# Patient Record
Sex: MALE | Race: WHITE
[De-identification: names, ages, dates, MRNs, and addresses within clinical notes are randomized per-mention and may not be internally consistent; named-entity substitution may affect disease eponyms.]

---

## 2018-09-29 ENCOUNTER — HOSPITAL ENCOUNTER (EMERGENCY)
Dept: HOSPITAL 75 - ER | Age: 38
LOS: 1 days | Discharge: HOME | End: 2018-09-30
Payer: COMMERCIAL

## 2018-09-29 VITALS — WEIGHT: 198 LBS | HEIGHT: 68 IN | BODY MASS INDEX: 30.01 KG/M2
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
## 2018-09-29 DIAGNOSIS — F17.210: ICD-10-CM
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
## 2018-09-29 DIAGNOSIS — S90.32XA: Primary | ICD-10-CM

## 2018-09-29 PROCEDURE — 73630 X-RAY EXAM OF FOOT: CPT

## 2018-09-29 PROCEDURE — 73610 X-RAY EXAM OF ANKLE: CPT

## 2018-09-29 NOTE — XMS REPORT
Continuity of Care Document

 Created on: 2013



IGNACIO ANDERSON

External Reference #: [id number]

: 1980

Sex: Male



Demographics







 Address  [address] [place]  [address]

 

 Home Phone  [phone]

 

 Preferred Language  Unknown

 

 Marital Status  Unknown

 

 Sikhism Affiliation  Unknown

 

 Race  Unknown

 

 Ethnic Group  Unknown





Author







 Author  [facility]

 

 Organization  [facility]

 

 Address  Unknown

 

 Phone  Unavailable







Support







 Name  Relationship  Address  Phone

 

 JUAN ANDERSON  Next Of Kin  [address] [place]  [address] [phone]







Care Team Providers







 Care Team Member Name  Role  Phone

 

 [facility]    [phone]



                                            



Insurance Providers

                      





 Payer Name                    Policy Number                    Subscriber Name
                    Relationship                

 

 Self Pay                                         Ignacio Anderson             
       01 Self / Same As Patient                



                                                                    



Advance Directives

                      





 Directive                    Response                    Recorded Date        
        

 

 Advance Directives                    N                    13 11:11pm   
             



                                                                               
         



Problems

          No Known Problems or Medical conditions.                             
                                       



Social History

                      





 History                    Response                    Recorded Date/Time     
           

 

 Alcohol Use                    Denies Use                    13 11:11pm 
               

 

 Recreational Drug Use                    N                    13 11:11pm
                



                                                                               
         



Allergies, Adverse Reactions, Alerts

                      





 Allergen                    Type                    Severity                   
 Reaction                    Last Updated                

 

 NKANo Known Allergies                    Allergy                    Unknown   
                                      06                



                                                                               
                   



Medications

                      





 Medication                    Dose                    Units                    
Route                    Sig                    Qty                    Days    
            

 

 Naproxen (Naprosyn)                    1                    Each              
      PO                    BID PRN                    20                      
               

 

 Hydrocodone Bit/Acetaminophen (Hydrocodon-Acetaminophen 5-325)                
    1 - 2                    Each                    PO                    Q6H 
PRN                    20                                     



                                                                               
         



Pregnancy

                      





 Response                    Recorded Date/Time                

 

 Status not known                    Unknown                



                                                                              



Results

          No Known Relevant Diagnostic Tests, Laboratory Data and/or Discharge 
Summary.                                                                    



Procedures

                      





 Procedure                    Code                    Date                

 

 COLONOSCOPY                    45.23                    06              
  

 

 ESOPHAGOGASTRODUODENOSCOPY [EGD] W/CLOSED BIOPSY                    45.16     
               06                

 

 UPPER GI ENDOSCOPY BIOPSY                    22582                    06
                



                                                                               
                   



Encounters

                      





 Encounter                    Location                    Date/Time            
    

 

 Departed Emergency Room                    [facility]                     11:01pm                

 

 Discharged Inpatient                    [facility]                     12:
00am
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
## 2018-09-29 NOTE — XMS REPORT
[facility]

 Created on: 2017



Smiley Ignacio

External Reference #: [id number]

: 1980

Sex: Male



Demographics







 Address  [address] [place]  [address]

 

 Preferred Language  Unknown

 

 Marital Status  Unknown

 

 Gnosticism Affiliation  Unknown

 

 Race  Unknown

 

 Ethnic Group  Unknown





Author







 Author  ELKIN CABRERA

 

 [facility]

 

 Address  Unknown

 

 Phone  [phone]







Care Team Providers







 Care Team Member Name  Role  Phone

 

 DEBORAH  ELKIN  Unavailable  [phone]







PROBLEMS







 Type  Condition  ICD9-CM Code  XNK19-VQ Code  Onset Dates  Condition Status  
SNOMED Code

 

 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







ALLERGIES

No Known Allergies



SOCIAL HISTORY

Never Assessed



PLAN OF CARE







 Activity  Details









  









 Follow Up  1 Week Reason:#19-te







VITAL SIGNS







 Height  68 in  2017

 

 Blood pressure systolic  152 mmHg  2017

 

 Blood pressure diastolic  105 mmHg  2017







MEDICATIONS







 Medication  Instructions  Dosage  Frequency  Start Date  End Date  Duration  
Status

 

 Amoxicillin 500 MG  Orally three times a day  1 tablet  8h    7 days  Active







RESULTS

No Results



PROCEDURES







 Procedure  Date Ordered  Result  Body Site

 

 LTD ORAL EVALUATION - PROBLEM FOCUS  2017      

 

 INTRAORL-PERIAPICAL 1 FILM 85055  2017      







IMMUNIZATIONS

No Known Immunizations



MEDICAL (GENERAL) HISTORY







 Type  Description  Date

 

 Medical History  bronchitis   

 

 Medical History  Seizures   

 

 Medical History  Ulcers   

 

 Medical History  Blood transfusion   

 

 Medical History  thyroid problems

## 2018-09-29 NOTE — XMS REPORT
Continuity of Care Document

 Created on: 10/06/2013



IGNACIO ANDERSON

External Reference #: [id number]

: 1980

Sex: Male



Demographics







 Address  [address] [place]  [address]

 

 Home Phone  [phone]

 

 Preferred Language  Unknown

 

 Marital Status  Unknown

 

 Jain Affiliation  Unknown

 

 Race  Unknown

 

 Ethnic Group  Unknown





Author







 Author  [facility]

 

 Organization  [facility]

 

 Address  Unknown

 

 Phone  Unavailable







Support







 Name  Relationship  Address  Phone

 

 JUAN ANDERSON  Next Of Kin  [address] [place]  [address] [phone]







Care Team Providers







 Care Team Member Name  Role  Phone

 

 [facility]    [phone]



                                            



Insurance Providers

                      





 Payer Name                    Policy Number                    Subscriber Name
                    Relationship                

 

 Self Pay                                         Ignacio Anderson             
       01 Self / Same As Patient                



                                                                    



Advance Directives

                      





 Directive                    Response                    Recorded Date        
        

 

 Advance Directives                    N                    10/06/13 9:49pm    
            

 

 Organ Donor                    Y                    10/06/13 9:49pm           
     



                                                                               
         



Problems

          No Known Problems or Medical conditions.                             
                                       



Social History

                      





 History                    Response                    Recorded Date/Time     
           

 

 Alcohol Use                    Denies Use                    10/06/13 9:49pm  
              

 

 Recreational Drug Use                    N                    10/06/13 9:49pm 
               



                                                                               
         



Allergies, Adverse Reactions, Alerts

                      





 Allergen                    Type                    Severity                   
 Reaction                    Last Updated                

 

 NKANo Known Allergies                    Allergy                    Unknown   
                                      06                



                                                                               
                   



Medications

                      





 Medication                    Dose                    Units                    
Route                    Sig                    Qty                    Days    
            

 

 Prednisone                    40                    Mg                    PO  
                  BID                                         4                



                                                                              



Pregnancy

                      





 Response                    Recorded Date/Time                

 

 Status not known                    Unknown                



                                                                              



Results

          No Known Relevant Diagnostic Tests, Laboratory Data and/or Discharge 
Summary.                                                                    



Procedures

                      





 Procedure                    Code                    Date                

 

 COLONOSCOPY                    45.23                    06              
  

 

 ESOPHAGOGASTRODUODENOSCOPY [EGD] W/CLOSED BIOPSY                    45.16     
               06                

 

 UPPER GI ENDOSCOPY BIOPSY                    15275                    06
                



                                                                               
                   



Encounters

                      





 Encounter                    Location                    Date/Time            
    

 

 Departed Emergency Room                    [facility]                    10/
06/13 9:42pm                

 

 Discharged Inpatient                    [facility]                     12:
00am

## 2018-09-30 VITALS — SYSTOLIC BLOOD PRESSURE: 140 MMHG | DIASTOLIC BLOOD PRESSURE: 90 MMHG

## 2018-09-30 NOTE — DIAGNOSTIC IMAGING REPORT
Indication: Pain.



Three views were obtained.



Findings: The alignment is normal. There is fracture or

dislocation. Soft tissues are unremarkable. 



Impression: No acute fracture or dislocation.



Dictated by: 



  Dictated on workstation # [id number]

## 2018-09-30 NOTE — DIAGNOSTIC IMAGING REPORT
INDICATION: Pain.



3 views of left ankle were obtained.



FINDINGS: The alignment is normal. The plafonds and talar dome

are intact. Ankle mortise is symmetric. There is no fracture or

dislocation. Soft tissues are unremarkable.



IMPRESSION: No focal abnormality in the left ankle.



Dictated by: 



  Dictated on workstation # [id number]

## 2019-12-09 ENCOUNTER — HOSPITAL ENCOUNTER (EMERGENCY)
Dept: HOSPITAL 75 - ER | Age: 39
LOS: 1 days | Discharge: HOME | End: 2019-12-10
Payer: SELF-PAY

## 2019-12-09 VITALS — WEIGHT: 205.03 LBS | HEIGHT: 67.8 IN | BODY MASS INDEX: 31.44 KG/M2

## 2019-12-09 DIAGNOSIS — Z87.891: ICD-10-CM

## 2019-12-09 DIAGNOSIS — K02.9: Primary | ICD-10-CM

## 2019-12-09 PROCEDURE — 99283 EMERGENCY DEPT VISIT LOW MDM: CPT

## 2019-12-10 VITALS — SYSTOLIC BLOOD PRESSURE: 183 MMHG | DIASTOLIC BLOOD PRESSURE: 96 MMHG

## 2019-12-10 NOTE — ED EENT
History of Present Illness


General


Chief Complaint:  Dental Problems/Pain


Stated Complaint:  POSS RT SIDE DENTAL PAIN


Nursing Triage Note:  


PT AMBULATE TO [facility] WITH C/O RIGHT SIDED DENTAL PAIN X1 WEEK. PT REPORTS THAT 


IT IS "THROWING MY EQUILIBRIUM OFF".


Source:  patient


Exam Limitations:  no limitations





History of Present Illness


Date Seen by Provider:  Dec 10, 2019


Time Seen by Provider:  00:54


Initial Comments


This 39-year-old man presents to the emergency room with complaints of pain in 

the right maxilla shooting down toward his neck and toward the right ear he 

believes this is coming from his teeth.  He denies any fever.  He has not taken 

any medications for this and has not seen the dentist.  Pain is been present for

a week and is escalating.





Allergies and Home Medications


Allergies


Coded Allergies:  


     REBAANo Known Allergies (Verified  Allergy, Unknown, 12/11/06)





Home Medications


Amoxicillin 500 Mg Capsule, 1,000 MG PO BID


   Prescribed by: MARTÍN MAURICIO on 12/10/19 0105


Naproxen 500 Mg Tablet, 500 MG PO BID


   Prescribed by: LEANDRO ZUNIGA on 9/30/18 0201





Patient Home Medication List


Home Medication List Reviewed:  Yes





Review of Systems


Review of Systems


Constitutional:  no symptoms reported


Eyes:  No Symptoms Reported


Ears:  No Symptoms Reported


Nose:  no symptoms reported


Mouth:  see HPI


Throat:  no symptoms reported


Respiratory:  no symptoms reported


Cardiovascular:  no symptoms reported


Gastrointestinal:  no symptoms reported


Musculoskeletal:  no symptoms reported


Skin:  no symptoms reported


Neurological:  No Symptoms Reported





Past Medical-Social-Family Hx


Past Med/Social Hx:  Reviewed Nursing Past Med/Soc Hx


Patient Social History


Alcohol Use:  Denies Use


Recreational Drug Use:  No


Smoking Status:  Former Smoker


Type Used:  Cigarettes


Former Smoker, Quit:  Nov 26, 2019


2nd Hand Smoke Exposure:  No


Recent Foreign Travel:  No


Contact w/Someone Who Travel:  No


Recent Infectious Disease Expo:  No


Recent Hopitalizations:  No


Physical Abuse:  No


Sexual Abuse:  No


Mistreated:  No


Fear:  No





Immunizations Up To Date


Tetanus Booster (TDap):  Less than 5yrs





Seasonal Allergies


Seasonal Allergies:  No





Past Medical History


Surgeries:  Yes


Abdominal (EGD and colonoscopy)


Respiratory:  No


Cardiac:  No


Neurological:  No


Genitourinary:  No


Gastrointestinal:  Yes


Ulcer


Musculoskeletal:  Yes


Arthritis, Chronic Back Pain


Endocrine:  No


HEENT:  No


Cancer:  No


Psychosocial:  No


Integumentary:  No


Blood Disorders:  No


Adverse Reaction/Blood Tranf:  No





Family Medical History


No Pertinent Family Hx





Physical Exam


Vital Signs





Vital Signs - First Documented








 12/9/19 12/10/19





 23:16 01:20


 


Temp 36.9 


 


Pulse 80 


 


Resp 18 


 


B/P (MAP) 183/96 (125) 


 


Pulse Ox  100


 


O2 Delivery Room Air 








Height, Weight, BMI


Height: 5'8"


Weight: 198lbs. oz. 89.354223ew; 31.00 BMI


Method:Stated


General Appearance:  WD/WN, mild distress


Eyes:  bilateral eye normal inspection, bilateral eye PERRL, bilateral eye EOMI


Ears:  bilateral ear auricle normal, bilateral ear canal normal, bilateral ear 

TM normal


Nose:  normal inspection


Mouth/Throat:  pharynx normal, other (numerous severely decayed teeth.  No overt

inflammatory process or abscess.)


Neck:  normal inspection


Cardiovascular:  regular rate, rhythm, no edema, no murmur


Respiratory:  lungs clear, normal breath sounds, no respiratory distress


Neurologic/Psychiatric:  CNs II-XII nml as tested, no motor/sensory deficits, 

alert, normal mood/affect, oriented x 3


Skin:  normal color, warm/dry





Procedures/Interventions





   Suture Size:  5-0





Progress/Results/Core Measures


Results/Orders


My Orders





Orders - MARTÍN LOYD MD


Amoxicillin Capsule (Polymox Capsule) (12/10/19 01:15)





Vital Signs/I&O











Blood Pressure Mean:                    125


POS








Progress


Progress Note :  


Progress Note


Patient was started on amoxicillin in the emergency room.  Since he has not yet 

tried any medications, he was advised to start with over-the-counter ibuprofen 

and Tylenol.  See discharge instructions.





Departure


Impression





   Primary Impression:  


   Dental decay


   Additional Impression:  


   Pain, dental


Disposition:  01 HOME, SELF-CARE


Condition:  Stable





Departure-Patient Inst.


Decision time for Depature:  01:03


Referrals:  


[facility] (PCP/Family)


Primary Care Physician


Patient Instructions:  Dental Pain, Tooth Decay, Adult (DC)





Add. Discharge Instructions:  


Complete your antibiotics as prescribed and see a dentist as soon as possible.


You may take ibuprofen up to 600 mg every 6 hours as needed and/or Tylenol 

(acetaminophen) up to 1000 mg every 6 hours as needed.


Brush your teeth gently with a soft bristle toothbrush at least twice daily.  If

tolerated, also rinse with antiseptic mouthwash.


Return to care if you have worsening symptoms or develop new symptoms such as 

fever.











All discharge instructions reviewed with patient and/or family. Voiced 

understanding.


Scripts


Amoxicillin (Amoxicillin) 500 Mg Capsule


1000 MG PO BID, #40 CAP 0 Refills


   Prov: MARTÍN LOYD MD         12/10/19











MARTÍN LOYD MD        Dec 10, 2019 01:08


POS

## 2019-12-22 ENCOUNTER — HOSPITAL ENCOUNTER (EMERGENCY)
Dept: HOSPITAL 75 - ER | Age: 39
Discharge: HOME | End: 2019-12-22
Payer: SELF-PAY

## 2019-12-22 VITALS — DIASTOLIC BLOOD PRESSURE: 90 MMHG | SYSTOLIC BLOOD PRESSURE: 142 MMHG

## 2019-12-22 VITALS — HEIGHT: 67.99 IN | BODY MASS INDEX: 30.31 KG/M2 | WEIGHT: 199.96 LBS
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
## 2019-12-22 DIAGNOSIS — Z87.891: ICD-10-CM

## 2019-12-22 DIAGNOSIS — S60.222A: Primary | ICD-10-CM

## 2019-12-22 PROCEDURE — 73130 X-RAY EXAM OF HAND: CPT

## 2019-12-22 NOTE — DIAGNOSTIC IMAGING REPORT
Left hand at 949 hours.



INDICATION: Injury, hand pain.



3 views were obtained.



FINDINGS: There is no fracture, dislocation or acute bony

abnormality evident. The soft tissues are unremarkable. There is

no sign of a radiopaque foreign body.



IMPRESSION: There is no evidence for an acute bony abnormality or

for a radiopaque foreign body.



Dictated by: 



  Dictated on workstation # [id number]

## 2019-12-22 NOTE — ED UPPER EXTREMITY
General


Chief Complaint:  Upper Extremity


Stated Complaint:  L HAND INJ/SWELLING


Source:  patient


Exam Limitations:  no limitations





History of Present Illness


Date Seen by Provider:  Dec 22, 2019


Time Seen by Provider:  09:26


Initial Comments


Patient is a pleasant left-handed gentleman who presents to ER by private 

conveyance because of swelling and pain and limited mobility in his left hand. 

Approximate 1700 yesterday about 16 hours ago he was working on a vehicle in his

left hand slipped off a tool hitting the dorsum of his hand against the fender 

well. He denies any numbness tingling weakness or previous fracture/surgery. No 

diabetes or blood thinners. No pain meds or ice.





Allergies and Home Medications


Allergies


Coded Allergies:  


     REBAANo Known Allergies (Verified  Allergy, Unknown, 06)





Home Medications


Amoxicillin 500 Mg Capsule, 1,000 MG PO BID


   Prescribed by: MARTÍN MAURICIO on 12/10/19 0105


Naproxen 500 Mg Tablet, 500 MG PO BID


   Prescribed by: LEANDRO ZUNIGA on 18 0201





Patient Home Medication List


Home Medication List Reviewed:  Yes





Review of Systems


Constitutional:  No chills, No diaphoresis


EENTM:  No ear discharge, No ear pain, No blurred vision


Respiratory:  No cough, No short of breath


Cardiovascular:  No chest pain, No edema


Gastrointestinal:  No abdominal pain, No nausea, No vomiting


Genitourinary:  No discharge, No dysuria


Musculoskeletal:  see HPI


Skin:  see HPI


Psychiatric/Neurological:  Denies Anxiety, Denies Depressed





Past Medical-Social-Family Hx


Patient Social History


Alcohol Use:  Denies Use


Recreational Drug Use:  No


Smoking Status:  Former Smoker


Type Used:  Cigarettes


Former Smoker, Quit:  2019


2nd Hand Smoke Exposure:  No


Recent Foreign Travel:  No


Contact w/Someone Who Travel:  No


Recent Hopitalizations:  No





Immunizations Up To Date


Tetanus Booster (TDap):  Less than 5yrs





Seasonal Allergies


Seasonal Allergies:  No





Past Medical History


Surgeries:  Yes


Abdominal


Respiratory:  No


Cardiac:  No


Neurological:  No


Genitourinary:  No


Gastrointestinal:  Yes


Ulcer


Musculoskeletal:  Yes


Arthritis, Chronic Back Pain


Endocrine:  No


HEENT:  No


Cancer:  No


Psychosocial:  No


Integumentary:  No


Blood Disorders:  No


Adverse Reaction/Blood Tranf:  No





Family Medical History


No Pertinent Family Hx





Physical Exam


Vital Signs





Vital Signs - First Documented








 19





 09:35


 


Temp 36.5


 


Pulse 68


 


B/P (MAP) 147/104 (118)


 


O2 Delivery Room Air





Capillary Refill :


Height, Weight, BMI


Height: 5'8"


Weight: 198lbs. oz. 89.236060lp; 31.00 BMI


Method:Stated


General Appearance:  WD/WN, no apparent distress


Cardiovascular:  normal peripheral pulses, regular rate, rhythm


Respiratory:  no respiratory distress, no accessory muscle use


Wrist:  Yes normal inspection, Yes non-tender, Yes no evidence of injury, Yes 

normal ROM


Hand:  Left, bone tenderness (third metacarpal and third digit proximal 

phalanx), limited ROM (lacks about 15% of the flexion), soft tissue tenderness, 

swelling (moderate dorsum of the hand over the third metacarpal)


Neurologic/Tendon:  normal sensation, normal motor functions, normal tendon 

functions


Neurologic/Psychiatric:  alert, normal mood/affect





Procedures/Interventions





   Suture Size:  5-0





Progress/Results/Core Measures


Results/Orders


My Orders





Orders - SANDI PADRON


Hand, Left, 3 Views (19 09:36)





Vital Signs/I&O











 19





 09:35


 


Temp 36.5


 


Pulse 68


 


B/P (MAP) 147/104 (118)


 


O2 Delivery Room Air











Progress


Progress Note :  


   Time:  09:40


Progress Note


He has declined anything for pain this time. Ice pack and compression. X-ray of 

the left hand 3 view.





Diagnostic Imaging





   Diagonstic Imaging:  Xray


   Plain Films/CT/US/NM/MRI:  hand (Left)


Comments


NAME:   JIM ANDERSON


MED REC#:   [id number]


ACCOUNT#:   [id number]


PT STATUS:   REG ER


:   1980


PHYSICIAN:   SANDI PADRON MD


ADMIT DATE:   19/ER


                                   ***Draft***


Date of Exam:19





HAND, LEFT, 3 VIEWS





Left hand at 949 hours.





INDICATION: Injury, hand pain.





3 views were obtained.





FINDINGS: There is no fracture, dislocation or acute bony


abnormality evident. The soft tissues are unremarkable. There is


no sign of a radiopaque foreign body.





IMPRESSION: There is no evidence for an acute bony abnormality or


for a radiopaque foreign body.





  Dictated on workstation # [id number]





Dict:   [id number]


Trans:   19 [address] [place] [address] [id number]





Interpreted by:     MARGO BRANDON MD


Electronically signed by:


   Reviewed:  Reviewed by Me





Departure


Impression





   Primary Impression:  


   Contusion of hand


   Qualified Codes:  S60.222A - Contusion of left hand, initial encounter


Disposition:  01 HOME, SELF-CARE


Condition:  Stable





Departure-Patient Inst.


Decision time for Depature:  10:05


Referrals:  


[facility] (PCP/Family)


Primary Care Physician


Patient Instructions:  Contusion (DC)





Add. Discharge Instructions:  


Contusion to your hands just a large bruise and soft-tissue injury which should 

resolve on its own over the next 1-2 weeks. 


An ice pack for the first 2-3 days applied every 4 hours for 20 minutes can be 

helpful.


Wrap your hand with an Ace bandage for compression to help keep the swelling and

pain down.


Tylenol and ibuprofen as necessary for pain.


If you're still having significant pain or difficulty moving your hand in 7-10 

days then you need to follow-up with the primary care doctor for reevaluation 

and for possible missed fractures. 


All discharge instructions reviewed with patient and/or family. Voiced 

understanding.











SANDI PADRON                 Dec 22, 2019 09:40

## 2021-03-09 ENCOUNTER — HOSPITAL ENCOUNTER (EMERGENCY)
Dept: HOSPITAL 75 - ER | Age: 41
Discharge: HOME | End: 2021-03-09
Payer: COMMERCIAL

## 2021-03-09 VITALS — BODY MASS INDEX: 32.96 KG/M2 | WEIGHT: 214.95 LBS | HEIGHT: 67.72 IN

## 2021-03-09 VITALS — DIASTOLIC BLOOD PRESSURE: 65 MMHG | SYSTOLIC BLOOD PRESSURE: 115 MMHG

## 2021-03-09 DIAGNOSIS — S30.811A: ICD-10-CM

## 2021-03-09 DIAGNOSIS — S80.212A: ICD-10-CM

## 2021-03-09 DIAGNOSIS — F17.210: ICD-10-CM
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
## 2021-03-09 DIAGNOSIS — S20.212A: Primary | ICD-10-CM

## 2021-03-09 DIAGNOSIS — S00.81XA: ICD-10-CM

## 2021-03-09 DIAGNOSIS — R40.2250: ICD-10-CM

## 2021-03-09 DIAGNOSIS — R40.2140: ICD-10-CM

## 2021-03-09 DIAGNOSIS — R40.2360: ICD-10-CM

## 2021-03-09 DIAGNOSIS — Z23: ICD-10-CM

## 2021-03-09 LAB
ALBUMIN SERPL-MCNC: 4.6 GM/DL (ref 3.2–4.5)
ALP SERPL-CCNC: 78 U/L (ref 40–136)
ALT SERPL-CCNC: 45 U/L (ref 0–55)
BILIRUB DIRECT SERPL-MCNC: 0.2 MG/DL (ref 0–0.3)
BILIRUB SERPL-MCNC: 0.6 MG/DL (ref 0.1–1)
BUN/CREAT SERPL: 15
CALCIUM SERPL-MCNC: 9.1 MG/DL (ref 8.5–10.1)
CHLORIDE SERPL-SCNC: 104 MMOL/L (ref 98–107)
CO2 SERPL-SCNC: 21 MMOL/L (ref 21–32)
CREAT SERPL-MCNC: 0.86 MG/DL (ref 0.6–1.3)
GFR SERPLBLD BASED ON 1.73 SQ M-ARVRAT: > 60 ML/MIN
GLUCOSE SERPL-MCNC: 92 MG/DL (ref 70–105)
HCT VFR BLD CALC: 51 % (ref 40–54)
HGB BLD-MCNC: 16.9 G/DL (ref 13.3–17.7)
MCH RBC QN AUTO: 30 PG (ref 25–34)
MCHC RBC AUTO-ENTMCNC: 34 G/DL (ref 32–36)
MCV RBC AUTO: 89 FL (ref 80–99)
PLATELET # BLD: 216 10^3/UL (ref 130–400)
PMV BLD AUTO: 11.8 FL (ref 9–12.2)
POTASSIUM SERPL-SCNC: 4.1 MMOL/L (ref 3.6–5)
PROT SERPL-MCNC: 7.8 GM/DL (ref 6.4–8.2)
SODIUM SERPL-SCNC: 136 MMOL/L (ref 135–145)
WBC # BLD AUTO: 13.7 10^3/UL (ref 4.3–11)

## 2021-03-09 PROCEDURE — 36415 COLL VENOUS BLD VENIPUNCTURE: CPT

## 2021-03-09 PROCEDURE — 80048 BASIC METABOLIC PNL TOTAL CA: CPT

## 2021-03-09 PROCEDURE — 74177 CT ABD & PELVIS W/CONTRAST: CPT

## 2021-03-09 PROCEDURE — 86850 RBC ANTIBODY SCREEN: CPT

## 2021-03-09 PROCEDURE — 86900 BLOOD TYPING SEROLOGIC ABO: CPT

## 2021-03-09 PROCEDURE — 86901 BLOOD TYPING SEROLOGIC RH(D): CPT

## 2021-03-09 PROCEDURE — 93041 RHYTHM ECG TRACING: CPT

## 2021-03-09 PROCEDURE — 80320 DRUG SCREEN QUANTALCOHOLS: CPT

## 2021-03-09 PROCEDURE — 71045 X-RAY EXAM CHEST 1 VIEW: CPT

## 2021-03-09 PROCEDURE — 71260 CT THORAX DX C+: CPT

## 2021-03-09 PROCEDURE — 80076 HEPATIC FUNCTION PANEL: CPT

## 2021-03-09 PROCEDURE — 85027 COMPLETE CBC AUTOMATED: CPT

## 2021-03-09 PROCEDURE — 90715 TDAP VACCINE 7 YRS/> IM: CPT

## 2021-03-09 PROCEDURE — 73562 X-RAY EXAM OF KNEE 3: CPT
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
## 2021-06-24 ENCOUNTER — HOSPITAL ENCOUNTER (EMERGENCY)
Dept: HOSPITAL 75 - ER | Age: 41
LOS: 1 days | Discharge: HOME | End: 2021-06-25
Payer: SELF-PAY

## 2021-06-24 VITALS — HEIGHT: 68.11 IN | BODY MASS INDEX: 32.58 KG/M2 | WEIGHT: 214.95 LBS

## 2021-06-24 VITALS — DIASTOLIC BLOOD PRESSURE: 93 MMHG | SYSTOLIC BLOOD PRESSURE: 143 MMHG

## 2021-06-24 DIAGNOSIS — I10: ICD-10-CM

## 2021-06-24 DIAGNOSIS — F17.210: ICD-10-CM
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
## 2021-06-24 DIAGNOSIS — S90.32XA: Primary | ICD-10-CM

## 2021-06-24 PROCEDURE — 73630 X-RAY EXAM OF FOOT: CPT

## 2021-06-25 NOTE — DIAGNOSTIC IMAGING REPORT
Exam: Left foot radiograph



Exam date: 06/25/2021



COMPARISON: Left foot radiograph 09/30/2018



HISTORY: Left foot pain.



TECHNIQUE: 3 views left foot.



FINDINGS: There is no acute fracture, dislocation, or destructive

osseous process. Joint spaces are normal. The soft tissues are

normal. No radiopaque foreign body.



IMPRESSION: No acute osseous abnormality of the left foot.



Dictated by: 



  Dictated on workstation # [id number]
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
## 2021-07-01 ENCOUNTER — HOSPITAL ENCOUNTER (EMERGENCY)
Dept: HOSPITAL 75 - ER | Age: 41
Discharge: HOME | End: 2021-07-01
Payer: SELF-PAY

## 2021-07-01 VITALS — DIASTOLIC BLOOD PRESSURE: 85 MMHG | SYSTOLIC BLOOD PRESSURE: 145 MMHG

## 2021-07-01 VITALS — BODY MASS INDEX: 33.2 KG/M2 | WEIGHT: 229.28 LBS | HEIGHT: 69.69 IN

## 2021-07-01 DIAGNOSIS — I10: ICD-10-CM

## 2021-07-01 DIAGNOSIS — K92.0: Primary | ICD-10-CM

## 2021-07-01 LAB
BASOPHILS # BLD AUTO: 0.1 10^3/UL (ref 0–0.1)
BASOPHILS NFR BLD AUTO: 1 % (ref 0–10)
BUN/CREAT SERPL: 8
CALCIUM SERPL-MCNC: 9.1 MG/DL (ref 8.5–10.1)
CHLORIDE SERPL-SCNC: 104 MMOL/L (ref 98–107)
CO2 SERPL-SCNC: 23 MMOL/L (ref 21–32)
CREAT SERPL-MCNC: 1.1 MG/DL (ref 0.6–1.3)
EOSINOPHIL # BLD AUTO: 0.1 10^3/UL (ref 0–0.3)
EOSINOPHIL NFR BLD AUTO: 1 % (ref 0–10)
GFR SERPLBLD BASED ON 1.73 SQ M-ARVRAT: > 60 ML/MIN
GLUCOSE SERPL-MCNC: 98 MG/DL (ref 70–105)
HCT VFR BLD CALC: 49 % (ref 40–54)
HGB BLD-MCNC: 16 G/DL (ref 13.3–17.7)
LYMPHOCYTES # BLD AUTO: 1.1 10^3/UL (ref 1–4)
LYMPHOCYTES NFR BLD AUTO: 12 % (ref 12–44)
MANUAL DIFFERENTIAL PERFORMED BLD QL: NO
MCH RBC QN AUTO: 30 PG (ref 25–34)
MCHC RBC AUTO-ENTMCNC: 33 G/DL (ref 32–36)
MCV RBC AUTO: 90 FL (ref 80–99)
MONOCYTES # BLD AUTO: 0.6 10^3/UL (ref 0–1)
MONOCYTES NFR BLD AUTO: 6 % (ref 0–12)
NEUTROPHILS # BLD AUTO: 7.2 10^3/UL (ref 1.8–7.8)
NEUTROPHILS NFR BLD AUTO: 80 % (ref 42–75)
PLATELET # BLD: 192 10^3/UL (ref 130–400)
PMV BLD AUTO: 11.4 FL (ref 9–12.2)
POTASSIUM SERPL-SCNC: 4 MMOL/L (ref 3.6–5)
SODIUM SERPL-SCNC: 137 MMOL/L (ref 135–145)
WBC # BLD AUTO: 9.1 10^3/UL (ref 4.3–11)

## 2021-07-01 PROCEDURE — 36415 COLL VENOUS BLD VENIPUNCTURE: CPT

## 2021-07-01 PROCEDURE — 80048 BASIC METABOLIC PNL TOTAL CA: CPT

## 2021-07-01 PROCEDURE — 85025 COMPLETE CBC W/AUTO DIFF WBC: CPT

## 2021-07-01 NOTE — ED GI
General


Chief Complaint:  Abdominal/GI Problems


Stated Complaint:  VOMMITTING BLOOD


Source of Information:  Patient


Exam Limitations:  No Limitations





History of Present Illness


Date Seen by Provider:  Jul 1, 2021


Time Seen by Provider:  21:04


Initial Comments


Patient is a 41-year-old male who presents to the emergency department today 

with a chief complaint of one episode of vomiting blood.  Patient states that he

was helping his son work on his truck and was under the truck for about 3 hours 

when he got up to go inside and all of a sudden became nauseous and vomited.  

Patient states there was stringy bits of blood in his vomit.  His wife sought 

and sent him to the emergency room for evaluation.  Patient states that he has a

little bit of left upper quadrant abdominal discomfort.  No discrete pain.  No 

other episodes of vomiting blood recently.  He did have an episode yesterday 

where he got to coughing and had some vomiting.  No blood reported there.  Does 

not complain of feeling lightheaded or dizzy or ongoing nausea.  No recent black

or bloody stools.  Patient states that he has a history of ulcers 13 or 14 years

ago and was on a PPI it sounds like for about a year afterwards.  He is followed

by [facility].  He is not on blood thinners and does not use ibuprofen or 

other NSAIDs.





Patient has been recently taking some amoxicillin that he had at home for an 

abscessed tooth.  Patient later thinks that when he looked at the abscessed 

tooth in the mirror that potentially the blood could have come from there.





All other review of systems reviewed and negative except as stated above.


Timing/Duration:  1-3 Hours


Severity/Quality:  Mild


Location:  [facility]


Radiation:  No Radiation


Activities at Onset:  Activity


Associated Symptoms:  Denies Symptoms





Allergies and Home Medications


Allergies


Coded Allergies:  


     NKANo Known Allergies (Verified  Allergy, Unknown, 12/11/06)





Home Medications


Amoxicillin 500 Mg Capsule, 1,000 MG PO BID


   Prescribed by: MARTÍN MAURICIO on 12/10/19 0105


Naproxen 500 Mg Tablet, 500 MG PO BID


   Prescribed by: LEANDRO ZUNIGA on 9/30/18 0201





Patient Home Medication List


Home Medication List Reviewed:  Yes





Review of Systems


Review of Systems


Constitutional:  see HPI


EENTM:  No Symptoms Reported


Respiratory:  No Symptoms Reported


Cardiovascular:  No Symptoms Reported


Gastrointestinal:  Denies Blood Streaked Stools; Other (Vomiting blood)


Genitourinary:  No Symptoms Reported


Musculoskeletal:  no symptoms reported


Skin:  no symptoms reported





All Other Systems Reviewed


Negative Unless Noted:  Yes





Past Medical-Social-Family Hx


Immunizations Up To Date


Tetanus Booster (TDap):  Less than 5yrs





Seasonal Allergies


Seasonal Allergies:  No





Past Medical History


Surgeries:  Yes (EGD/COLONOSCOPY)


Abdominal


Respiratory:  No


Cardiac:  Yes


Hypertension


Neurological:  No


Genitourinary:  No


Gastrointestinal:  Yes


Ulcer


Musculoskeletal:  Yes


Arthritis, Chronic Back Pain


Endocrine:  No


HEENT:  No


Cancer:  No


Psychosocial:  No


Integumentary:  No


Blood Disorders:  No


Adverse Reaction/Blood Tranf:  No





Family Medical History


No Pertinent Family Hx





Physical Exam


Vital Signs





Vital Signs - First Documented








 7/1/21





 20:41


 


Temp 38.1


 


Pulse 112


 


Resp 18


 


B/P (MAP) 142/97 (112)


 


Pulse Ox 94


 


O2 Delivery Room Air





Capillary Refill :


Height/Weight/BMI


Height: 5'8"


Weight: 198lbs. oz. 89.580212dk; 32.00 BMI


Method:Stated


General Appearance:  WD/WN, no apparent distress


HEENT:  normal ENT inspection, other (Left lower jaw premolar with fractured 

tooth and surrounding gingival edema.  No obvious drainage or bleeding is noted)


Neck:  full range of motion


Respiratory:  lungs clear, normal breath sounds, no respiratory distress, no 

accessory muscle use


Cardiovascular:  regular rate, rhythm, tachycardia (105)


Gastrointestinal:  normal bowel sounds, soft, no organomegaly, tenderness 

(Minimal left upper quadrant tenderness to palpation)


Extremities:  normal range of motion, non-tender, normal inspection, no pedal 

edema


Neurologic/Psychiatric:  alert, normal mood/affect, oriented x 3


Skin:  normal color, warm/dry





Procedures/Interventions





   Suture Size:  5-0





Progress/Results/Core Measures


Results/Orders


Lab Results





Laboratory Tests








Test


 7/1/21


20:54 Range/Units


 


 


White Blood Count


 9.1 


 4.3-11.0


10^3/uL


 


Red Blood Count


 5.37 


 4.30-5.52


10^6/uL


 


Hemoglobin 16.0  13.3-17.7  g/dL


 


Hematocrit 49  40-54  %


 


Mean Corpuscular Volume 90  80-99  fL


 


Mean Corpuscular Hemoglobin 30  25-34  pg


 


Mean Corpuscular Hemoglobin


Concent 33 


 32-36  g/dL





 


Red Cell Distribution Width 13.2  10.0-14.5  %


 


Platelet Count


 192 


 130-400


10^3/uL


 


Mean Platelet Volume 11.4  9.0-12.2  fL


 


Immature Granulocyte % (Auto) 0   %


 


Neutrophils (%) (Auto) 80 H 42-75  %


 


Lymphocytes (%) (Auto) 12  12-44  %


 


Monocytes (%) (Auto) 6  0-12  %


 


Eosinophils (%) (Auto) 1  0-10  %


 


Basophils (%) (Auto) 1  0-10  %


 


Neutrophils # (Auto)


 7.2 


 1.8-7.8


10^3/uL


 


Lymphocytes # (Auto)


 1.1 


 1.0-4.0


10^3/uL


 


Monocytes # (Auto)


 0.6 


 0.0-1.0


10^3/uL


 


Eosinophils # (Auto)


 0.1 


 0.0-0.3


10^3/uL


 


Basophils # (Auto)


 0.1 


 0.0-0.1


10^3/uL


 


Immature Granulocyte # (Auto)


 0.0 


 0.0-0.1


10^3/uL


 


Sodium Level 137  135-145  MMOL/L


 


Potassium Level 4.0  3.6-5.0  MMOL/L


 


Chloride Level 104    MMOL/L


 


Carbon Dioxide Level 23  21-32  MMOL/L


 


Anion Gap 10  5-14  MMOL/L


 


Blood Urea Nitrogen 9  7-18  MG/DL


 


Creatinine


 1.10 


 0.60-1.30


MG/DL


 


Estimat Glomerular Filtration


Rate > 60 


  





 


BUN/Creatinine Ratio 8   


 


Glucose Level 98    MG/DL


 


Calcium Level 9.1  8.5-10.1  MG/DL








My Orders





Orders - KO PRUITT MD


Cbc With Automated Diff (7/1/21 21:14)


Basic Metabolic Panel (7/1/21 21:14)





Vital Signs/I&O











 7/1/21





 20:41


 


Temp 38.1


 


Pulse 112


 


Resp 18


 


B/P (MAP) 142/97 (112)


 


Pulse Ox 94


 


O2 Delivery Room Air











Progress


Progress Note :  


   Time:  21:40


Progress Note


Patient's labs reviewed and within normal limits.  Hemoglobin is appropriate.  

BUN is normal.  Patient clinically looks well.  Counseled patient on follow-up 

and return precautions.  Patient will continue to take his amoxicillin twice 

daily for 7 to 10 days.


I have given the patient good return precautions.  He verbalized understanding. 

All questions are sought and answered.  Patient is stable for discharge.





Departure


Impression





   Primary Impression:  


   Hematemesis


   Qualified Codes:  K92.0 - Hematemesis


Disposition:  01 HOME, SELF-CARE


Condition:  Stable





Departure-Patient Inst.


Decision time for Depature:  21:41


Referrals:  


[facility] (PCP/Family)


Primary Care Physician


Patient Instructions:  Gastrointestinal Bleeding (DC)





Add. Discharge Instructions:  


Drink plenty of fluids to stay well-hydrated.





If you have another episode of vomiting with blood, please come back to the 

emergency room for reevaluation.





You can also follow-up with your primary care provider at [facility].











KO PRUITT MD          Jul 1, 2021 21:19

## 2022-06-08 ENCOUNTER — HOSPITAL ENCOUNTER (OUTPATIENT)
Dept: HOSPITAL 75 - RT | Age: 42
End: 2022-06-08
Attending: INTERNAL MEDICINE
Payer: COMMERCIAL

## 2022-06-08 DIAGNOSIS — R06.2: Primary | ICD-10-CM

## 2022-06-08 PROCEDURE — 94726 PLETHYSMOGRAPHY LUNG VOLUMES: CPT

## 2022-06-08 PROCEDURE — 94729 DIFFUSING CAPACITY: CPT

## 2022-06-08 PROCEDURE — 94060 EVALUATION OF WHEEZING: CPT

## 2024-06-10 NOTE — ED LOWER EXTREMITY
General


Stated Complaint:  LEFT FOOT PAIN


Source:  patient


Exam Limitations:  no limitations





History of Present Illness


Date Seen by Provider:  Sep 30, 2018


Time Seen by Provider:  01:25


Initial Comments


PT ARRIVES VIA POV 


C/O LEFT FOOT INJURY


STATES HE WAS LOADING METAL DRUMS ( WEIGHING APPROXIMATELY 50 LBS ) ONTO A 

TRUCK AND ONE ROLLED OFF THE BACK OF THE TRUCK AS HE WAS LOADING IT, AND LANDED 

ON HIS LEFT FOOT/ANKLE AREA


OCCURRED FRIDAY MORNING AROUND 10:30 AM. 09/28/18--WORKS AT [employer]


REPORTED TO SUPERVISOR AT THE TIME, BUT DID NOT SEEK CARE UNTIL TONIGHT. 


STATES IT DID NOT HURT TOO BAD "UNTIL IT GOT TO WHERE I CAN'T WALK"  ( PT 

AMBULATED INTO ER AND BACK  INTO ROOM ON HIS OWN) 


NO PARESTHESIAS OR MOTOR DEFICITS


NO PRIOR INJURY TO THIS FOOT





PCP: JHONATAN





Allergies and Home Medications


Allergies


Coded Allergies:  


     NKANo Known Allergies (Verified  Allergy, Unknown, 12/11/06)





Home Medications


Naproxen 500 Mg Tablet, 500 MG PO BID


   Prescribed by: LEANDRO ZUNIGA on 9/30/18 0201





Patient Home Medication List


Home Medication List Reviewed:  Yes





Review of Systems


Constitutional:  no symptoms reported


Musculoskeletal:  see HPI


Skin:  no symptoms reported


Psychiatric/Neurological:  No Symptoms Reported





Past Medical-Social-Family Hx


Patient Social History


Alcohol Use:  Occasionally Uses


Recreational Drug Use:  No


Smoking Status:  Current Everyday Smoker


Type Used:  Cigarettes


Recent Foreign Travel:  No


Contact w/Someone Who Travel:  No


Recent Hopitalizations:  No





Immunizations Up To Date


Tetanus Booster (TDap):  More than 5yrs





Seasonal Allergies


Seasonal Allergies:  No





Past Medical History


Surgeries:  No


Respiratory:  No


Cardiac:  No


Neurological:  No


Genitourinary:  No


Gastrointestinal:  No


Musculoskeletal:  Yes


Arthritis, Chronic Back Pain


HEENT:  No


Cancer:  No


Psychosocial:  No


Integumentary:  No


Blood Disorders:  No


Adverse Reaction/Blood Tranf:  No





Family Medical History


No Pertinent Family Hx





Physical Exam


Vital Signs





Vital Signs - First Documented








 9/30/18 9/30/18





 01:23 02:22


 


Temp 96.7 


 


Pulse 67 


 


Resp 17 


 


B/P (MAP) 140/92 (108) 


 


Pulse Ox  97


 


O2 Delivery Room Air 





Capillary Refill :


Height, Weight, BMI


Height: 5'8"


Weight: 198lbs. oz. 89.860018xp; 30.10 BMI


Method:Stated


General Appearance:  WD/WN, no apparent distress, other (AMBULATES IN ON OWN )


Legs:  left leg normal inspection


Knees:  left knee normal inspection


Ankles:  left ankle other (MILD TENDERNESS TO ANTERIOR ASPECT OF LEFT ANKLE)


Feet:  left foot bone tenderness (TENDERESS TO TOP OF FOOT), left foot 

ecchymosis (FAINT BRUISE  TO TOP OF FOOT), left foot limited range of motion, 

left foot pain, left foot soft tissue tenderness, left foot swelling (MILD 

SWELLING TO TOP OF FOOT), left foot other


Neurologic/Tendon:  normal sensation, normal motor functions, normal tendon 

functions


Neurologic/Psychiatric:  CNs II-XII nml as tested, no motor/sensory deficits, 

alert, normal mood/affect, oriented x 3


Skin:  normal color, warm/dry





Procedures/Interventions





   Suture Size:  5-0


Splinting and Joint Reduction :  


   Ace wrap:  Yes


   Splints:  Post Op Shoe





Progress/Results/Core Measures


Results/Orders


My Orders





Orders - LEANDRO ZUNIGA DO


Foot, Left, 3 Views (9/30/18 01:28)


Ankle, Left, 3 Views (9/30/18 01:28)


Post-Op Shoe (9/30/18 01:58)


Rx-Naproxen (Rx-Naprosyn) (9/30/18 01:58)


Ace Bandage (9/30/18 02:19)


Rx-Naproxen (Rx-Naprosyn) (9/30/18 02:11)





Vital Signs/I&O











 9/30/18 9/30/18





 01:23 02:22


 


Temp 96.7 96.7


 


Pulse 67 68


 


Resp 17 17


 


B/P (MAP) 140/92 (108) 140/90


 


Pulse Ox  97


 


O2 Delivery Room Air Room Air








Comment


XRAYS LEFT FOOT AND ANKLE--NO ACUTE BONY INJURY, PENDING RADIOLOGIST REVIEW





Departure


Impression





 Primary Impression:  


 Contusion of left foot


Disposition:  01 HOME, SELF-CARE


Condition:  Stable





Departure-Patient Inst.


Referrals:  


[facility] ([facility]/Family)


Primary Care Physician


Patient Instructions:  Contusion (DC)





Add. Discharge Instructions:  


WEAR POST OP SHOE AS NEEDED FOR PAIN 





ICE TO AREA AT 20 MINUTE INTERVALS





ELEVATE FOOT AS MUCH AS POSSIBLE





FOLLOW UP WITH [facility] ON MONDAY FOR FURTHER CARE


Scripts


Naproxen (Naproxen) 500 Mg Tablet


500 MG PO BID, #20 TAB


   Prov: LEANDRO ZUNIGA DO         9/30/18





Images


Extremities-Lower











1 - Mild, Swelling, Tenderness, Other-See Progress Note





Progress


SMALL AREA OF FAINT BRUISING TO TOP OF LEFT FOOT











LEANDRO ZUNIGA DO Sep 30, 2018 01:39 Have we ever prescribed this med? Yes.  If yes, what date? 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